# Patient Record
Sex: FEMALE | Race: BLACK OR AFRICAN AMERICAN | URBAN - METROPOLITAN AREA
[De-identification: names, ages, dates, MRNs, and addresses within clinical notes are randomized per-mention and may not be internally consistent; named-entity substitution may affect disease eponyms.]

---

## 2017-01-04 ENCOUNTER — EMERGENCY (EMERGENCY)
Facility: HOSPITAL | Age: 42
LOS: 1 days | Discharge: HOME | End: 2017-01-04
Attending: EMERGENCY MEDICINE | Admitting: EMERGENCY MEDICINE
Payer: COMMERCIAL

## 2017-01-04 VITALS
RESPIRATION RATE: 15 BRPM | WEIGHT: 184.97 LBS | DIASTOLIC BLOOD PRESSURE: 4 MMHG | TEMPERATURE: 98 F | HEART RATE: 82 BPM | SYSTOLIC BLOOD PRESSURE: 110 MMHG | OXYGEN SATURATION: 99 %

## 2017-01-04 DIAGNOSIS — Z98.891 HISTORY OF UTERINE SCAR FROM PREVIOUS SURGERY: Chronic | ICD-10-CM

## 2017-01-04 PROCEDURE — 99284 EMERGENCY DEPT VISIT MOD MDM: CPT | Mod: 25

## 2017-01-04 PROCEDURE — 93010 ELECTROCARDIOGRAM REPORT: CPT

## 2017-01-04 NOTE — ED PROVIDER NOTE - MEDICAL DECISION MAKING DETAILS
41y F presents with CAP, normal vital signs, no comorbidities, port score low applicable for outpatient management. Given 1st dose Levaquin in ED, written for 6 days of Levaquin once daily. Pt instructed to return for worsening symptoms or new symptoms and follow up in PMD advised repeat CXR to confirm symptoms resolved 1 month later. 41y F presents with CAP, normal vital signs, no comorbidities, port score low applicable for outpatient management. Given 1st dose Levaquin in ED, written for 6 days of Levaquin once daily. Pt instructed to return for worsening symptoms or new symptoms and follow up in PMD advised repeat CXR to confirm symptoms resolved 1 month later.  Patient informed of ED visit findings, understands plan.  Patient provided with written and further verbal instructions not included in discharge paperwork.  Patient instructed to follow up with their primary care physician in 2-3 days and return for new, worsened, or persistent symptoms.  Chest pain associated with coughing and inspiration, no hemoptysis or PE risk factors.  Chest pain noncardiac.

## 2017-01-04 NOTE — ED PROVIDER NOTE - OBJECTIVE STATEMENT
41y F presents to the ED c/o cough x 1 week. Pt states she was seen by PMD had CXR which showed left sided PNA. PMD told pt to come into ED due to duration of cough. Cough is productive. Pt endorses left sided chest pain and fever Tmax 103 F. Did not take medication today. Past surgeries: . Nonsmoker. NKDA.

## 2017-01-04 NOTE — ED PROVIDER NOTE - NS ED MD SCRIBE ATTENDING SCRIBE SECTIONS
PAST MEDICAL/SURGICAL/SOCIAL HISTORY/HISTORY OF PRESENT ILLNESS/HIV/PHYSICAL EXAM/DISPOSITION/VITAL SIGNS( Pullset)/REVIEW OF SYSTEMS

## 2017-01-04 NOTE — ED PROVIDER NOTE - PHYSICAL EXAMINATION
Zoë att: General: Well appearing, nontoxic, no acute distress; Head: Normocephalic Atraumatic; Eyes: PERRL, EOMI; ENT: Airway patent; Neck: supple; Chest: faint rales left lower lung zone; Cardiac: Regular rate and rhythm, no murmurs, rubs or gallops; Abdomen: soft, nontender, nondistended; no guarding or rebound; Musculoskeletal: Calves symmetric, nontender, no palpable cord; Skin: No rash, normal skin tone; Neuro: Alert and Oriented to person, place, and time; No focal deficit, CN 2-12 symmetric and intact

## 2017-09-01 NOTE — ED PROVIDER NOTE - PRINCIPAL DIAGNOSIS
From: Roque Bolanos  To: Fernanda Coreas MD  Sent: 9/1/2017 11:42 AM CDT  Subject: Prescription Question    Hi there.  I received a letter from our insurance that they will no longer cover One Touch Ultra Strips through [facility] as of to Community acquired pneumonia